# Patient Record
Sex: MALE | Race: WHITE | NOT HISPANIC OR LATINO | Employment: FULL TIME | URBAN - METROPOLITAN AREA
[De-identification: names, ages, dates, MRNs, and addresses within clinical notes are randomized per-mention and may not be internally consistent; named-entity substitution may affect disease eponyms.]

---

## 2019-05-24 PROBLEM — Z21 HIV POSITIVE: Status: ACTIVE | Noted: 2019-05-24

## 2019-06-13 PROBLEM — C64.9 PAPILLARY RENAL CELL CARCINOMA: Status: ACTIVE | Noted: 2019-06-13

## 2019-06-13 PROBLEM — N48.6 PEYRONIE DISEASE: Status: ACTIVE | Noted: 2019-06-13

## 2019-06-13 PROBLEM — N40.1 BENIGN LOCALIZED PROSTATIC HYPERPLASIA WITH LOWER URINARY TRACT SYMPTOMS (LUTS): Status: ACTIVE | Noted: 2019-06-13

## 2019-09-19 PROBLEM — E87.5 HYPERKALEMIA: Chronic | Status: ACTIVE | Noted: 2019-09-19

## 2019-09-19 PROBLEM — Z90.5 HX OF UNILATERAL NEPHRECTOMY: Status: ACTIVE | Noted: 2019-02-27

## 2019-09-19 PROBLEM — N18.30 CKD (CHRONIC KIDNEY DISEASE) STAGE 3, GFR 30-59 ML/MIN: Chronic | Status: ACTIVE | Noted: 2019-09-19

## 2020-11-10 PROBLEM — N25.81 SECONDARY HYPERPARATHYROIDISM: Status: ACTIVE | Noted: 2020-11-10

## 2020-11-10 PROBLEM — I10 ESSENTIAL HYPERTENSION: Status: ACTIVE | Noted: 2020-11-10

## 2020-11-10 PROBLEM — N18.32 STAGE 3B CHRONIC KIDNEY DISEASE: Status: ACTIVE | Noted: 2020-11-10

## 2020-11-10 PROBLEM — E55.9 VITAMIN D DEFICIENCY: Status: ACTIVE | Noted: 2020-11-10

## 2021-05-11 PROBLEM — I10 ESSENTIAL HYPERTENSION: Status: RESOLVED | Noted: 2020-11-10 | Resolved: 2021-05-11

## 2021-05-12 ENCOUNTER — PATIENT MESSAGE (OUTPATIENT)
Dept: RESEARCH | Facility: HOSPITAL | Age: 71
End: 2021-05-12

## 2023-09-12 PROBLEM — R35.1 NOCTURIA ASSOCIATED WITH BENIGN PROSTATIC HYPERPLASIA: Status: ACTIVE | Noted: 2023-09-12

## 2023-09-12 PROBLEM — N40.1 NOCTURIA ASSOCIATED WITH BENIGN PROSTATIC HYPERPLASIA: Status: ACTIVE | Noted: 2023-09-12

## 2025-03-07 PROBLEM — Z86.19 HISTORY OF SYPHILIS: Status: ACTIVE | Noted: 2025-03-07

## 2025-03-07 PROBLEM — F02.B2: Status: ACTIVE | Noted: 2025-03-07

## 2025-03-07 PROBLEM — R41.89 COGNITIVE IMPAIRMENT: Status: ACTIVE | Noted: 2025-03-07

## 2025-03-07 PROBLEM — G30.9: Status: ACTIVE | Noted: 2025-03-07

## 2025-04-24 ENCOUNTER — OUTPATIENT CASE MANAGEMENT (OUTPATIENT)
Dept: ADMINISTRATIVE | Facility: OTHER | Age: 75
End: 2025-04-24

## 2025-04-30 ENCOUNTER — OUTPATIENT CASE MANAGEMENT (OUTPATIENT)
Dept: ADMINISTRATIVE | Facility: OTHER | Age: 75
End: 2025-04-30

## 2025-04-30 NOTE — PROGRESS NOTES
Consult received stating patient's family requested a SW. MSW intern called patient's daughter ( Armando Bell [phone]) to discuss patient. MSW intern left a message for return call. MSW intern will continue to follow and assist.

## 2025-05-07 ENCOUNTER — OUTPATIENT CASE MANAGEMENT (OUTPATIENT)
Dept: ADMINISTRATIVE | Facility: OTHER | Age: 75
End: 2025-05-07

## 2025-05-07 NOTE — PROGRESS NOTES
MSW intern called patient's daughter ( Armando [phone]) to discuss OPCM. MSW intern did not receive an answer. MSW intern left message for return call.

## 2025-05-14 ENCOUNTER — OUTPATIENT CASE MANAGEMENT (OUTPATIENT)
Dept: ADMINISTRATIVE | Facility: OTHER | Age: 75
End: 2025-05-14

## 2025-05-21 ENCOUNTER — OUTPATIENT CASE MANAGEMENT (OUTPATIENT)
Dept: ADMINISTRATIVE | Facility: OTHER | Age: 75
End: 2025-05-21

## 2025-05-21 NOTE — PROGRESS NOTES
MSW intern has made 3 attempts to contact patient's daughter ( [phone]) to discuss consult and OPCM program. MSW intern has not been able to speak with patient's daughter at this time. Patient's case will be closed at this time.